# Patient Record
Sex: FEMALE | Employment: FULL TIME | URBAN - METROPOLITAN AREA
[De-identification: names, ages, dates, MRNs, and addresses within clinical notes are randomized per-mention and may not be internally consistent; named-entity substitution may affect disease eponyms.]

---

## 2023-03-20 ENCOUNTER — HOSPITAL ENCOUNTER (OUTPATIENT)
Dept: PHYSICAL THERAPY | Age: 28
Discharge: HOME | End: 2023-03-20
Payer: COMMERCIAL

## 2023-03-20 PROCEDURE — 97161 PT EVAL LOW COMPLEX 20 MIN: CPT | Performed by: PHYSICAL THERAPIST

## 2023-03-20 NOTE — THERAPY EVALUATION
Physical Therapy at [facility],   a part of Jessie Amita Mcghee  [facility], [address] [place] [address], [address]  Phone: [phone]  Fax: [phone]    Plan of Care/Statement of Necessity for Physical Therapy Services  2-15    Patient name: Nilo Wakler  : 1995  Provider#: [id number]  Referral source: Maciej Hughes*      Medical/Treatment Diagnosis: Lumbago with sciatica, right side [M54.41]     Prior Hospitalization: see medical history     Comorbidities: depression  Prior Level of Function: complete 20 minutes of exercise at least 3 times a week; yoga, barre    Medications: Verified on Patient Summary List    Start of Care: 3/20/2023      Onset Date: 2023       The Plan of Care and following information is based on the information from the initial evaluation. Assessment/ key information: 28year old female presenting with lumbar and right hip pain secondary to lumbar facet dysfunction, sacral and sacroiliac dysfunction in presence of lumbar, pelvis and core muscle performance deficit aggravated by postural and movement associated lumbar strain.     Evaluation Complexity History MEDIUM  Complexity : 1-2 comorbidities / personal factors will impact the outcome/ POC ; Examination HIGH Complexity : 4+ Standardized tests and measures addressing body structure, function, activity limitation and / or participation in recreation  ;Presentation LOW Complexity : Stable, uncomplicated  ;Clinical Decision Making MEDIUM Complexity : FOTO score of 26-74  Overall Complexity Rating: LOW     Problem List: pain affecting function, decrease ROM, decrease strength, impaired gait/ balance, decrease ADL/ functional abilitiies, decrease activity tolerance, and decrease flexibility/ joint mobility   Treatment Plan may include any combination of the following: Therapeutic exercise, Neuromuscular reeducation, Manual therapy, Therapeutic activity, and Self care/home management  Patient / Family readiness to learn indicated by: asking questions and trying to perform skills  Persons(s) to be included in education: patient (P)  Barriers to Learning/Limitations: None  Patient Goal (s): return to activity without back or right hip pain  Patient Self Reported Health Status: excellent  Rehabilitation Potential: good    Short Term Goals: To be accomplished in 2-4 treatments:  1) Pt will be Independent with HEP  2) Pt will be able to Sit greater than 60 minutes without pain  3) Pt will be able to Stand greater than 60 minutes without increase of pain  4) Pt will be able to perform supine-sit  without increase of pain    Long Term Goals: To be accomplished in 6-10 treatments:  1)  Pt will be able to perform bed mobility without pain. 2) Pt will be able to resume yoga/barre without pain  3) Pt will be able to retrieve item form ground without pain  4) Pt will be able to carry >/= 20 lbs without pain      Frequency / Duration: Patient to be seen 1-2 times per week for 6-10 treatments. Patient/ Caregiver education and instruction: activity modification    [x]  Plan of care has been reviewed with SUNITA Urbano, PT, DPT 3/20/2023     ________________________________________________________________________    I certify that the above Therapy Services are being furnished while the patient is under my care. I agree with the treatment plan and certify that this therapy is necessary.     Physician's Signature:____________________  Date:____________Time: _________      Anna Burgos

## 2023-03-20 NOTE — PROGRESS NOTES
PT INITIAL EVALUATION NOTE - [facility] 2-15    Patient Name: Jessica Berman  Date:3/20/2023  : 1995  [x]  Patient  Verified  Payor: Vernadine Allis / Plan: Karlee Virginia / Product Type: HMO /    In time: 225p  Out time: 320p  Total Treatment Time (min): 55  Total Timed Codes (min): --  1:1 Treatment Time ( only): --   Visit #: 1     Treatment Area: Lumbago with sciatica, right side [M54.41]    SUBJECTIVE  Pain Level (0-10 scale): 210  Any medication changes, allergies to medications, adverse drug reactions, diagnosis change, or new procedure performed?: [] No    [x] Yes (see summary sheet for update)  Subjective:    Has been dealing with recurrent back pain for the past year. Has been going to a chiropractor with intermittent success. Onset of right hip pain approx 1 month ago. She has noticed occasional pain into her right heel when standing. Considers pain as dull achy, worse when rolling around when sleeping. Typically more painful in AM but eases as the day goes on. She does feel back pain if she does too much back extension work at [employer] or yoga, Sitting > 60 minutes, it hip is painful then walking hurts, squatting, picking up items from ground. Did ballet dancing growing up. OBJECTIVE    Posture:  shifting weight to left side in sitting  Other Observations:  --  Gait and Functional Mobility:  deviation to left LE with squatting; slow transition with bed mobility and supine-sit transfer  Palpation: tenderness bilateral lumbar paraspinal muscles L4-sacrum and right iliac crest region, hip ER group        Lumbar AROM:          R  L    Flexion    50   --    Extension   15 P!  --    Side Bending   15 P! R 15    Rotation   30 P!  30 P!         LOWER QUARTER   MUSCLE STRENGTH  KEY       R  L  0 - No Contraction  L1, L2 Psoas  5  5  1 - Trace   L3 Quads  5  5  2 - Poor   L4 Tib Ant  5  5  3 - Fair    L5 EHL  5  5  4 - Good   S1 Peroneals  5  5  5 - Normal   S2 Hams  5  5    Flexibility: hamstring normal  Mobility Assessment: hypermobility L4/5 and L5/S1      MMT:               HIP Ext: 4/5 P! bilateral              HIP Abd: 4/5 bilateral  Neurological: Reflexes / Sensations: Patella tendon 2 Bilateral without fatigue present; Achilles 2 bilaterally with fatigue present on Right  Special Tests:     Forward Bend: positive    Slump: negative   H.S. SLR: negative     Lumbar Distraction: reduced pain   Spout [place] Test: positive on Right                  With   [x] TE   [] TA   [] Neuro   [] SC   [] other: Patient Education: [x] Review HEP    [] Progressed/Changed HEP based on:   [] positioning   [] body mechanics   [] transfers   [] heat/ice application    [] other:      Other Objective/Functional Measures: FOTO Functional Measure: 56/100                         Pain Level (0-10 scale) post treatment: 2/10    ASSESSMENT/Changes in Function:     [x]  See Plan of Care      Annabelle Llanos, PT, DPT 3/20/2023

## 2023-03-28 ENCOUNTER — HOSPITAL ENCOUNTER (OUTPATIENT)
Dept: PHYSICAL THERAPY | Age: 28
Discharge: HOME | End: 2023-03-28
Payer: COMMERCIAL

## 2023-03-28 PROCEDURE — 97140 MANUAL THERAPY 1/> REGIONS: CPT | Performed by: PHYSICAL THERAPIST

## 2023-03-28 PROCEDURE — 97110 THERAPEUTIC EXERCISES: CPT | Performed by: PHYSICAL THERAPIST

## 2023-03-28 NOTE — PROGRESS NOTES
PT DAILY TREATMENT NOTE - [facility] 2-15    Patient Name: Abraham Luna  Date:3/28/2023  : 1995  [x]  Patient  Verified  Payor: David Dillon / Plan: Americo Arnold / Product Type: HMO /    In time: 8014R  Out time: 155p  Total Treatment Time (min): 60  Total Timed Codes (min): 60  1:1 Treatment Time ( only): 60   Visit #:  2    Treatment Area: Lumbago with sciatica, right side [M54.41]    SUBJECTIVE  Pain Level (0-10 scale): 2/10  Any medication changes, allergies to medications, adverse drug reactions, diagnosis change, or new procedure performed?: [x] No    [] Yes (see summary sheet for update)  Subjective functional status/changes:   [] No changes reported  Pt states that she is about the same. OBJECTIVE      45 min Therapeutic Exercise:  [x] See flow sheet : review of current status   Rationale: increase ROM, increase strength, improve coordination, improve balance, and increase proprioception to improve the patients ability to maintain core stabilization with activity      15 min Manual Therapy: STM right lumbar paraspinal L3-Sacrum; prone lumbosacral distraction    Rationale: decrease pain, increase ROM, and increase tissue extensibility to improve the patients ability to maintain core stabilization with activity            With   [x] TE   [] TA   [] Neuro   [] SC   [] other: Patient Education: [x] Review HEP    [] Progressed/Changed HEP based on:   [] positioning   [] body mechanics   [] transfers   [] heat/ice application    [] other:      Other Objective/Functional Measures: --     Pain Level (0-10 scale) post treatment: 1/10    ASSESSMENT/Changes in Function:   Reduced lumbar pain on right side by end of session. Did report reproduction of pain with left rotation especially with 1/2 Bengali get up. Did have difficulty with maintaining core stability and rotation control in basic multifidus activity.   Patient will continue to benefit from skilled PT services to modify and progress therapeutic interventions, address functional mobility deficits, address ROM deficits, address strength deficits, analyze and address soft tissue restrictions, analyze and cue movement patterns, analyze and modify body mechanics/ergonomics, and assess and modify postural abnormalities to attain remaining goals. []  See Plan of Care  []  See progress note/recertification  []  See Discharge Summary         Progress towards goals / Updated goals:  Short Term Goals: To be accomplished in 2-4 treatments:  1) Pt will be Independent with HEP  2) Pt will be able to Sit greater than 60 minutes without pain  3) Pt will be able to Stand greater than 60 minutes without increase of pain  4) Pt will be able to perform supine-sit  without increase of pain     Long Term Goals: To be accomplished in 6-10 treatments:  1)  Pt will be able to perform bed mobility without pain.   2) Pt will be able to resume yoga/barre without pain  3) Pt will be able to retrieve item form ground without pain  4) Pt will be able to carry >/= 20 lbs without pain                          PLAN  [x]  Upgrade activities as tolerated     [x]  Continue plan of care  [x]  Update interventions per flow sheet       []  Discharge due to:_  []  Other:_      Ki Degree, PT, DPT 3/28/2023

## 2023-03-30 ENCOUNTER — HOSPITAL ENCOUNTER (OUTPATIENT)
Dept: PHYSICAL THERAPY | Age: 28
End: 2023-03-30
Payer: COMMERCIAL

## 2023-03-30 PROCEDURE — 97110 THERAPEUTIC EXERCISES: CPT | Performed by: PHYSICAL THERAPIST

## 2023-03-30 PROCEDURE — 97140 MANUAL THERAPY 1/> REGIONS: CPT | Performed by: PHYSICAL THERAPIST

## 2023-03-30 NOTE — PROGRESS NOTES
PT DAILY TREATMENT NOTE - [facility] 2-15    Patient Name: Camilla Valenzuela  Date:3/30/2023  : 1995  [x]  Patient  Verified  Payor: Valentina Torres / Plan: Falguni Gonzalez / Product Type: HMO /    In time: 316p  Out time: 406  Total Treatment Time (min): 50  Total Timed Codes (min): 50  1:1 Treatment Time ( only): 39   Visit #:  3    Treatment Area: Lumbago with sciatica, right side [M54.41]    SUBJECTIVE  Pain Level (0-10 scale): 2/10  Any medication changes, allergies to medications, adverse drug reactions, diagnosis change, or new procedure performed?: [x] No    [] Yes (see summary sheet for update)  Subjective functional status/changes:   [] No changes reported  \"Right low back has been feeling great however left low back is now been painful. \"    OBJECTIVE      35 min Therapeutic Exercise:  [x] See flow sheet : review of current status   Rationale: increase ROM, increase strength, improve coordination, improve balance, and increase proprioception to improve the patients ability to maintain core stabilization with activity      15 min Manual Therapy: STM left  lumbar paraspinal L3-Sacrum; prone lumbosacral distraction    Rationale: decrease pain, increase ROM, and increase tissue extensibility to improve the patients ability to maintain core stabilization with activity            With   [x] TE   [] TA   [] Neuro   [] SC   [] other: Patient Education: [x] Review HEP    [] Progressed/Changed HEP based on:   [] positioning   [] body mechanics   [] transfers   [] heat/ice application    [] other:      Other Objective/Functional Measures: --     Pain Level (0-10 scale) post treatment: 1/10    ASSESSMENT/Changes in Function:   Left side of the lumbar pain experience during left rotation resolved following manual and therapeutic exercises. If pain returns with rotation next session consider mobilization of L4 and L5 motion segments.   Patient will continue to benefit from skilled PT services to modify and progress therapeutic interventions, address functional mobility deficits, address ROM deficits, address strength deficits, analyze and address soft tissue restrictions, analyze and cue movement patterns, analyze and modify body mechanics/ergonomics, and assess and modify postural abnormalities to attain remaining goals. []  See Plan of Care  []  See progress note/recertification  []  See Discharge Summary         Progress towards goals / Updated goals:  Short Term Goals: To be accomplished in 2-4 treatments:  1) Pt will be Independent with HEP MET  2) Pt will be able to Sit greater than 60 minutes without pain Progressing  3) Pt will be able to Stand greater than 60 minutes without increase of pain  4) Pt will be able to perform supine-sit  without increase of pain     Long Term Goals: To be accomplished in 6-10 treatments:  1)  Pt will be able to perform bed mobility without pain.   2) Pt will be able to resume yoga/barre without pain  3) Pt will be able to retrieve item form ground without pain  4) Pt will be able to carry >/= 20 lbs without pain                          PLAN  [x]  Upgrade activities as tolerated     [x]  Continue plan of care  [x]  Update interventions per flow sheet       []  Discharge due to:_  []  Other:_      Ratna Demarco, PT, DPT 3/30/2023

## 2023-04-03 ENCOUNTER — HOSPITAL ENCOUNTER (OUTPATIENT)
Dept: PHYSICAL THERAPY | Age: 28
End: 2023-04-03
Payer: COMMERCIAL

## 2023-04-03 PROCEDURE — 97140 MANUAL THERAPY 1/> REGIONS: CPT

## 2023-04-03 PROCEDURE — 97110 THERAPEUTIC EXERCISES: CPT

## 2023-04-03 NOTE — PROGRESS NOTES
PT DAILY TREATMENT NOTE - [facility] 2-15    Patient Name: Yara Jack  Date:4/3/2023  : 1995  [x]  Patient  Verified  Payor: Sarahi Walden / Plan: Jermaine Night / Product Type: HMO /    In time: 10:03A Out time: 10:55A  Total Treatment Time (min): 52  Total Timed Codes (min): 52  1:1 Treatment Time ([facility] only): 52   Visit #:  4    Treatment Area: Lumbago with sciatica, right side [M54.41]    SUBJECTIVE  Pain Level (0-10 scale): 1/10  Any medication changes, allergies to medications, adverse drug reactions, diagnosis change, or new procedure performed?: [x] No    [] Yes (see summary sheet for update)  Subjective functional status/changes:   [] No changes reported  Pt reported her travels to [place] went well. Still only compliant is sleeping at night. Did not do her HEP due to being out of town    OBJECTIVE      37 min Therapeutic Exercise:  [x] See flow sheet : review of current status   Rationale: increase ROM, increase strength, improve coordination, improve balance, and increase proprioception to improve the patients ability to maintain core stabilization with activity      15 min Manual Therapy: STM left  lumbar paraspinal L3-Sacrum; prone lumbosacral distraction MET to correct R ERSR L3   Rationale: decrease pain, increase ROM, and increase tissue extensibility to improve the patients ability to maintain core stabilization with activity            With   [x] TE   [] TA   [] Neuro   [] SC   [] other: Patient Education: [x] Review HEP    [] Progressed/Changed HEP based on:   [] positioning   [] body mechanics   [] transfers   [] heat/ice application    [] other:      Other Objective/Functional Measures: --     Pain Level (0-10 scale) post treatment: 1/10    ASSESSMENT/Changes in Function:   Pt reported feeling \"more even\" following MET correction for R lumbar rotation. Advised pt on home program. Education on taking breaks during cleaning.  Walking dog with core engagement along with how to maintain this throughout the day. Patient will continue to benefit from skilled PT services to modify and progress therapeutic interventions, address functional mobility deficits, address ROM deficits, address strength deficits, analyze and address soft tissue restrictions, analyze and cue movement patterns, analyze and modify body mechanics/ergonomics, and assess and modify postural abnormalities to attain remaining goals. []  See Plan of Care  []  See progress note/recertification  []  See Discharge Summary         Progress towards goals / Updated goals:  Short Term Goals: To be accomplished in 2-4 treatments:  1) Pt will be Independent with HEP MET  2) Pt will be able to Sit greater than 60 minutes without pain Progressing  3) Pt will be able to Stand greater than 60 minutes without increase of pain  4) Pt will be able to perform supine-sit  without increase of pain     Long Term Goals: To be accomplished in 6-10 treatments:  1)  Pt will be able to perform bed mobility without pain.   2) Pt will be able to resume yoga/barre without pain  3) Pt will be able to retrieve item form ground without pain  4) Pt will be able to carry >/= 20 lbs without pain                          PLAN  [x]  Upgrade activities as tolerated     [x]  Continue plan of care  [x]  Update interventions per flow sheet       []  Discharge due to:_  []  Other:_      Jennie Wright, PTA 4/3/2023

## 2023-04-06 ENCOUNTER — APPOINTMENT (OUTPATIENT)
Dept: PHYSICAL THERAPY | Age: 28
End: 2023-04-06
Payer: COMMERCIAL

## 2023-04-18 ENCOUNTER — HOSPITAL ENCOUNTER (OUTPATIENT)
Dept: PHYSICAL THERAPY | Age: 28
Discharge: HOME | End: 2023-04-18
Payer: COMMERCIAL

## 2023-04-18 PROCEDURE — 97140 MANUAL THERAPY 1/> REGIONS: CPT | Performed by: PHYSICAL THERAPIST

## 2023-04-18 PROCEDURE — 97110 THERAPEUTIC EXERCISES: CPT | Performed by: PHYSICAL THERAPIST

## 2023-04-18 NOTE — PROGRESS NOTES
Physical Therapy at [facility],   a part of [facility]  [address] [address], [address] [place] [address], [address] [place] [address]  Phone: [phone]  Fax: [phone]    Discharge Summary  2-15    Patient name: Nusrat Cárdenas  : 1995  Provider#: [id number]  Referral source: Mana Cordero*      Medical/Treatment Diagnosis: Lumbago with sciatica, right side [M54.41]     Prior Hospitalization: see medical history     Comorbidities: depression  Prior Level of Function: complete 20 minutes of exercise at least 3 times a week; yoga, barre     Medications: Verified on Patient Summary List     Start of Care: 3/20/2023                                                                              Onset Date: 2023     Visits from Start of Care: 6     Missed Visits: 0  Reporting Period : 3/20/2023 to 2023    Progress towards goals / Updated goals:  Short Term Goals: To be accomplished in 2-4 treatments:  1) Pt will be Independent with HEP MET  2) Pt will be able to Sit greater than 60 minutes without pain MET  3) Pt will be able to Stand greater than 60 minutes without increase of pain MET  4) Pt will be able to perform supine-sit  without increase of pain MET     Long Term Goals: To be accomplished in 6-10 treatments:  1)  Pt will be able to perform bed mobility without pain. MET  2) Pt will be able to resume yoga/barre without pain MET  3) Pt will be able to retrieve item form ground without pain MET  4) Pt will be able to carry >/= 20 lbs without pain  MET             ASSESSMENT/SUMMARY OF CARE:  Benedicto Steve reports that she has not had any pain since before her last session. She was able to complete her yoga and bar class without pain. Able to achieve full lumbar mobility without pain and demonstrating competency to continue with established HEP on her own now. Recommend continuing HEP x 2-4 more weeks to maintain success.       FOTO Functional Measure: 56/100 Intake   79/100 Discharge    RECOMMENDATIONS:  [x]Discontinue therapy: [x]Patient has reached or is progressing toward set goals      []Patient is non-compliant or has abdicated      []Due to lack of appreciable progress towards set goals    Ratna Demarco, PT, DPT 4/18/2023

## 2023-04-18 NOTE — PROGRESS NOTES
PT DAILY TREATMENT NOTE - [facility] 2-15    Patient Name: Veronika Jerry  Date:2023  : 1995  [x]  Patient  Verified  Payor: Kimani Care / Plan: Loretta Her / Product Type: HMO /    In time: 920a Out time: 1024a  Total Treatment Time (min): 64  Total Timed Codes (min): 59  1:1 Treatment Time ( only): 61   Visit #:  6    Treatment Area: Lumbago with sciatica, right side [M54.41]    SUBJECTIVE  Pain Level (0-10 scale): /10  Any medication changes, allergies to medications, adverse drug reactions, diagnosis change, or new procedure performed?: [x] No    [] Yes (see summary sheet for update)  Subjective functional status/changes:   [] No changes reported  Pt reports that she has not had any pain since before her last session. She was able to complete her yoga and bar class without pain. OBJECTIVE      41 min Therapeutic Exercise:  [x] See flow sheet : review of current status   Rationale: increase ROM, increase strength, improve coordination, improve balance, and increase proprioception to improve the patients ability to maintain core stabilization with activity      23 min Manual Therapy: STM left  lumbar paraspinal L3-Sacrum; Left L4/5 and L5/S1 rotation mobilization grade 3-4; lumbar sidebending R + Flexion and Resisted trunk rotation Right +ext MET    Rationale: decrease pain, increase ROM, and increase tissue extensibility to improve the patients ability to maintain core stabilization with activity            With   [x] TE   [] TA   [] Neuro   [] SC   [] other: Patient Education: [x] Review HEP    [] Progressed/Changed HEP based on:   [] positioning   [] body mechanics   [] transfers   [] heat/ice application    [] other:      Other Objective/Functional Measures: -- Rotation R 55  L 50 no pain either direction    Pain Level (0-10 scale) post treatment: 0/10    ASSESSMENT/Changes in Function:   Resolution of left lumbar pain during left rotation with flexion following manual treatment.   Able to achieve full lumbar mobility without pain and demonstrating competency to continue with established HEP on her own now. Recommend continuing HEP x 2-4 more weeks to maintain success. César Dillard PTA assisted with some manual treatment. []  See Plan of Care  []  See progress note/recertification  [x]  See Discharge Summary         Progress towards goals / Updated goals:  Short Term Goals: To be accomplished in 2-4 treatments:  1) Pt will be Independent with HEP MET  2) Pt will be able to Sit greater than 60 minutes without pain MET  3) Pt will be able to Stand greater than 60 minutes without increase of pain MET  4) Pt will be able to perform supine-sit  without increase of pain MET     Long Term Goals: To be accomplished in 6-10 treatments:  1)  Pt will be able to perform bed mobility without pain.  MET  2) Pt will be able to resume yoga/barre without pain MET  3) Pt will be able to retrieve item form ground without pain MET  4) Pt will be able to carry >/= 20 lbs without pain  MET                        PLAN  []  Upgrade activities as tolerated     []  Continue plan of care  []  Update interventions per flow sheet       [x]  Discharge due to:_completion of goals  []  Other:_      Cherie Hawkins, PT, DPT 4/18/2023